# Patient Record
Sex: FEMALE | Race: WHITE | NOT HISPANIC OR LATINO | Employment: OTHER | URBAN - METROPOLITAN AREA
[De-identification: names, ages, dates, MRNs, and addresses within clinical notes are randomized per-mention and may not be internally consistent; named-entity substitution may affect disease eponyms.]

---

## 2018-12-14 NOTE — PATIENT DISCUSSION
This visual field clearly demonstrated a minimum of 46% loss of upper field of vision OU, with upper lid skin in repose and elevated by taping of the lid to demonstrate potential correction. This field shows that taping the lids significantly improved this patient's superior field of vision by approximately 44%, OU.

## 2019-02-05 NOTE — PROCEDURE NOTE: SURGICAL
"<span style=""font-weight: bold;"">MR #:&nbsp;</span>&nbsp;[id number]<br /> <br /> <span style=""font-weight: bold;"">PREOPERATIVE DIAGNOSIS:</span>&nbsp;Dermatochalasis upper lids<br /> <br /> <span style=""font-weight: bold;"">POSTOPERATIVE DIAGNOSIS:</span>&nbsp;Same<br /> <br /> <span style=""font-weight: bold;"">OPERATIVE PROCEDURE:</span>&nbsp; Upper lid blepharoplasty both eyes<br /> <br /> <span style=""font-weight: bold;"">ANESTHESIA: &nbsp;</span> &nbsp;&nbsp; Local MAC<br /> <br /> <span style=""font-weight: bold;"">ESTIMATED BLOOD LOSS:</span> &nbsp;Minimal

## 2019-10-28 ENCOUNTER — RETINA CONSULT (OUTPATIENT)
Dept: URBAN - METROPOLITAN AREA CLINIC 39 | Facility: CLINIC | Age: 69
End: 2019-10-28

## 2019-10-28 DIAGNOSIS — H35.341: ICD-10-CM

## 2019-10-28 DIAGNOSIS — H35.033: ICD-10-CM

## 2019-10-28 DIAGNOSIS — H35.371: ICD-10-CM

## 2019-10-28 DIAGNOSIS — H35.372: ICD-10-CM

## 2019-10-28 PROCEDURE — 99204 OFFICE O/P NEW MOD 45 MIN: CPT

## 2019-10-28 PROCEDURE — 92134 CPTRZ OPH DX IMG PST SGM RTA: CPT

## 2019-10-28 PROCEDURE — 92235 FLUORESCEIN ANGRPH MLTIFRAME: CPT

## 2019-10-28 PROCEDURE — 92225 OPHTHALMOSCOPY (INITIAL): CPT

## 2019-10-28 PROCEDURE — 92250 FUNDUS PHOTOGRAPHY W/I&R: CPT

## 2019-10-28 RX ORDER — PREDNISOLONE ACETATE 10 MG/ML: 1 SUSPENSION/ DROPS OPHTHALMIC

## 2019-10-28 ASSESSMENT — VISUAL ACUITY
OD_SC: 20/80
OS_SC: 20/70+2

## 2019-10-28 ASSESSMENT — TONOMETRY
OS_IOP_MMHG: 19
OD_IOP_MMHG: 20

## 2019-11-26 ENCOUNTER — FOLLOW UP (OUTPATIENT)
Dept: URBAN - METROPOLITAN AREA CLINIC 46 | Facility: CLINIC | Age: 69
End: 2019-11-26

## 2019-11-26 DIAGNOSIS — H35.341: ICD-10-CM

## 2019-11-26 DIAGNOSIS — H35.371: ICD-10-CM

## 2019-11-26 DIAGNOSIS — H35.372: ICD-10-CM

## 2019-11-26 DIAGNOSIS — H35.033: ICD-10-CM

## 2019-11-26 DIAGNOSIS — H35.30: ICD-10-CM

## 2019-11-26 DIAGNOSIS — H35.09: ICD-10-CM

## 2019-11-26 PROCEDURE — 92012 INTRM OPH EXAM EST PATIENT: CPT

## 2019-11-26 PROCEDURE — 92134 CPTRZ OPH DX IMG PST SGM RTA: CPT

## 2019-11-26 ASSESSMENT — TONOMETRY
OS_IOP_MMHG: 14
OD_IOP_MMHG: 17

## 2019-11-26 ASSESSMENT — VISUAL ACUITY
OD_PH: 20/70
OD_SC: 20/80-1
OS_PH: 20/40+1
OS_SC: 20/50+2

## 2020-01-29 ENCOUNTER — ESTABLISHED COMPREHENSIVE EXAM (OUTPATIENT)
Dept: URBAN - METROPOLITAN AREA CLINIC 39 | Facility: CLINIC | Age: 70
End: 2020-01-29

## 2020-01-29 DIAGNOSIS — H35.341: ICD-10-CM

## 2020-01-29 DIAGNOSIS — H35.033: ICD-10-CM

## 2020-01-29 DIAGNOSIS — H35.372: ICD-10-CM

## 2020-01-29 DIAGNOSIS — H35.371: ICD-10-CM

## 2020-01-29 PROCEDURE — 92235 FLUORESCEIN ANGRPH MLTIFRAME: CPT

## 2020-01-29 PROCEDURE — 92250 FUNDUS PHOTOGRAPHY W/I&R: CPT

## 2020-01-29 PROCEDURE — 92012 INTRM OPH EXAM EST PATIENT: CPT

## 2020-01-29 ASSESSMENT — TONOMETRY
OD_IOP_MMHG: 16
OS_IOP_MMHG: 14

## 2020-01-29 ASSESSMENT — VISUAL ACUITY
OS_SC: 20/70-1
OD_SC: 20/80-1

## 2020-05-18 ENCOUNTER — ESTABLISHED COMPREHENSIVE EXAM (OUTPATIENT)
Dept: URBAN - METROPOLITAN AREA CLINIC 39 | Facility: CLINIC | Age: 70
End: 2020-05-18

## 2020-05-18 DIAGNOSIS — H35.033: ICD-10-CM

## 2020-05-18 DIAGNOSIS — H35.09: ICD-10-CM

## 2020-05-18 DIAGNOSIS — H35.30: ICD-10-CM

## 2020-05-18 DIAGNOSIS — H35.371: ICD-10-CM

## 2020-05-18 DIAGNOSIS — H35.372: ICD-10-CM

## 2020-05-18 DIAGNOSIS — H35.341: ICD-10-CM

## 2020-05-18 PROCEDURE — 92134 CPTRZ OPH DX IMG PST SGM RTA: CPT

## 2020-05-18 PROCEDURE — 92235 FLUORESCEIN ANGRPH MLTIFRAME: CPT

## 2020-05-18 PROCEDURE — 92014 COMPRE OPH EXAM EST PT 1/>: CPT

## 2020-05-18 PROCEDURE — 92250 FUNDUS PHOTOGRAPHY W/I&R: CPT

## 2020-05-18 ASSESSMENT — VISUAL ACUITY
OS_SC: 20/40-1
OD_SC: 20/50-2

## 2021-05-17 ENCOUNTER — ESTABLISHED COMPREHENSIVE EXAM (OUTPATIENT)
Dept: URBAN - METROPOLITAN AREA CLINIC 39 | Facility: CLINIC | Age: 71
End: 2021-05-17

## 2021-05-17 DIAGNOSIS — H35.371: ICD-10-CM

## 2021-05-17 DIAGNOSIS — H35.033: ICD-10-CM

## 2021-05-17 DIAGNOSIS — H35.372: ICD-10-CM

## 2021-05-17 DIAGNOSIS — H35.09: ICD-10-CM

## 2021-05-17 DIAGNOSIS — H35.341: ICD-10-CM

## 2021-05-17 PROCEDURE — 92235 FLUORESCEIN ANGRPH MLTIFRAME: CPT

## 2021-05-17 PROCEDURE — 99214 OFFICE O/P EST MOD 30 MIN: CPT

## 2021-05-17 PROCEDURE — 92250 FUNDUS PHOTOGRAPHY W/I&R: CPT

## 2021-05-17 ASSESSMENT — VISUAL ACUITY
OS_SC: 20/50-2
OD_SC: 20/80

## 2021-05-17 ASSESSMENT — TONOMETRY
OD_IOP_MMHG: 14
OS_IOP_MMHG: 13

## 2021-06-28 ENCOUNTER — ESTABLISHED COMPREHENSIVE EXAM (OUTPATIENT)
Dept: URBAN - METROPOLITAN AREA CLINIC 39 | Facility: CLINIC | Age: 71
End: 2021-06-28

## 2021-06-28 DIAGNOSIS — H25.13: ICD-10-CM

## 2021-06-28 DIAGNOSIS — H35.372: ICD-10-CM

## 2021-06-28 DIAGNOSIS — H52.02: ICD-10-CM

## 2021-06-28 DIAGNOSIS — H35.371: ICD-10-CM

## 2021-06-28 DIAGNOSIS — H52.4: ICD-10-CM

## 2021-06-28 DIAGNOSIS — H52.203: ICD-10-CM

## 2021-06-28 DIAGNOSIS — H35.033: ICD-10-CM

## 2021-06-28 DIAGNOSIS — H35.30: ICD-10-CM

## 2021-06-28 DIAGNOSIS — H35.341: ICD-10-CM

## 2021-06-28 DIAGNOSIS — H43.822: ICD-10-CM

## 2021-06-28 DIAGNOSIS — H35.09: ICD-10-CM

## 2021-06-28 PROCEDURE — 92014 COMPRE OPH EXAM EST PT 1/>: CPT

## 2021-06-28 PROCEDURE — 92134 CPTRZ OPH DX IMG PST SGM RTA: CPT

## 2021-06-28 PROCEDURE — 92015 DETERMINE REFRACTIVE STATE: CPT

## 2021-06-28 ASSESSMENT — VISUAL ACUITY
OD_SC: 20/100-1
OD_SC: <J12
OU_SC: 20/40
OS_SC: J12
OS_SC: 20/50+1

## 2021-06-28 ASSESSMENT — TONOMETRY
OS_IOP_MMHG: 16
OD_IOP_MMHG: 15

## 2021-11-15 ENCOUNTER — ESTABLISHED PATIENT (OUTPATIENT)
Dept: URBAN - METROPOLITAN AREA CLINIC 39 | Facility: CLINIC | Age: 71
End: 2021-11-15

## 2021-11-15 DIAGNOSIS — D23.122: ICD-10-CM

## 2021-11-15 PROCEDURE — 99213 OFFICE O/P EST LOW 20 MIN: CPT

## 2021-11-15 PROCEDURE — 92285 EXTERNAL OCULAR PHOTOGRAPHY: CPT

## 2021-11-15 PROCEDURE — 67840 REMOVE EYELID LESION: CPT

## 2021-11-15 RX ORDER — ERYTHROMYCIN 5 MG/G
OINTMENT OPHTHALMIC TWICE A DAY
End: 2021-11-29

## 2021-11-15 ASSESSMENT — VISUAL ACUITY
OS_SC: 20/50
OD_SC: 20/60

## 2022-05-23 ENCOUNTER — COMPREHENSIVE EXAM (OUTPATIENT)
Dept: URBAN - METROPOLITAN AREA CLINIC 39 | Facility: CLINIC | Age: 72
End: 2022-05-23

## 2022-05-23 DIAGNOSIS — H35.373: ICD-10-CM

## 2022-05-23 DIAGNOSIS — H35.341: ICD-10-CM

## 2022-05-23 DIAGNOSIS — H35.033: ICD-10-CM

## 2022-05-23 DIAGNOSIS — H43.822: ICD-10-CM

## 2022-05-23 DIAGNOSIS — H35.09: ICD-10-CM

## 2022-05-23 PROCEDURE — 92250 FUNDUS PHOTOGRAPHY W/I&R: CPT

## 2022-05-23 PROCEDURE — 99214 OFFICE O/P EST MOD 30 MIN: CPT

## 2022-05-23 PROCEDURE — 92235 FLUORESCEIN ANGRPH MLTIFRAME: CPT

## 2022-05-23 ASSESSMENT — VISUAL ACUITY
OS_PH: 20/40
OD_SC: 20/70+2
OS_SC: 20/60

## 2022-05-23 ASSESSMENT — TONOMETRY
OD_IOP_MMHG: 15
OS_IOP_MMHG: 16

## 2022-11-28 ENCOUNTER — COMPREHENSIVE EXAM (OUTPATIENT)
Dept: URBAN - METROPOLITAN AREA CLINIC 39 | Facility: CLINIC | Age: 72
End: 2022-11-28

## 2022-11-28 DIAGNOSIS — H43.822: ICD-10-CM

## 2022-11-28 DIAGNOSIS — H35.341: ICD-10-CM

## 2022-11-28 DIAGNOSIS — H35.033: ICD-10-CM

## 2022-11-28 DIAGNOSIS — H35.373: ICD-10-CM

## 2022-11-28 DIAGNOSIS — H52.4: ICD-10-CM

## 2022-11-28 DIAGNOSIS — H35.30: ICD-10-CM

## 2022-11-28 DIAGNOSIS — H25.813: ICD-10-CM

## 2022-11-28 DIAGNOSIS — H35.09: ICD-10-CM

## 2022-11-28 DIAGNOSIS — H52.02: ICD-10-CM

## 2022-11-28 DIAGNOSIS — H52.203: ICD-10-CM

## 2022-11-28 PROCEDURE — 92014 COMPRE OPH EXAM EST PT 1/>: CPT

## 2022-11-28 PROCEDURE — 92015 DETERMINE REFRACTIVE STATE: CPT

## 2022-11-28 PROCEDURE — 92134 CPTRZ OPH DX IMG PST SGM RTA: CPT

## 2022-11-28 ASSESSMENT — VISUAL ACUITY
OD_SC: 20/100
OS_SC: J16
OU_SC: 20/30
OS_SC: 20/30-1
OU_SC: J7
OD_SC: J16

## 2022-11-28 ASSESSMENT — TONOMETRY
OS_IOP_MMHG: 12
OD_IOP_MMHG: 15

## 2023-05-08 ENCOUNTER — COMPREHENSIVE EXAM (OUTPATIENT)
Dept: URBAN - METROPOLITAN AREA CLINIC 39 | Facility: CLINIC | Age: 73
End: 2023-05-08

## 2023-05-08 DIAGNOSIS — H35.30: ICD-10-CM

## 2023-05-08 DIAGNOSIS — H35.033: ICD-10-CM

## 2023-05-08 DIAGNOSIS — H43.822: ICD-10-CM

## 2023-05-08 DIAGNOSIS — H35.373: ICD-10-CM

## 2023-05-08 DIAGNOSIS — H35.341: ICD-10-CM

## 2023-05-08 DIAGNOSIS — H25.813: ICD-10-CM

## 2023-05-08 DIAGNOSIS — H35.09: ICD-10-CM

## 2023-05-08 PROCEDURE — 99214 OFFICE O/P EST MOD 30 MIN: CPT

## 2023-05-08 PROCEDURE — 92235 FLUORESCEIN ANGRPH MLTIFRAME: CPT

## 2023-05-08 PROCEDURE — 92250 FUNDUS PHOTOGRAPHY W/I&R: CPT

## 2023-05-08 ASSESSMENT — VISUAL ACUITY
OS_CC: 20/30-1
OD_CC: 20/70-1

## 2023-05-08 ASSESSMENT — TONOMETRY
OS_IOP_MMHG: 16
OD_IOP_MMHG: 14

## 2023-07-05 ENCOUNTER — CONSULTATION/EVALUATION (OUTPATIENT)
Dept: URBAN - METROPOLITAN AREA CLINIC 39 | Facility: CLINIC | Age: 73
End: 2023-07-05

## 2023-07-05 DIAGNOSIS — H35.30: ICD-10-CM

## 2023-07-05 DIAGNOSIS — H04.123: ICD-10-CM

## 2023-07-05 DIAGNOSIS — H35.033: ICD-10-CM

## 2023-07-05 DIAGNOSIS — H52.02: ICD-10-CM

## 2023-07-05 DIAGNOSIS — H35.341: ICD-10-CM

## 2023-07-05 DIAGNOSIS — H43.822: ICD-10-CM

## 2023-07-05 DIAGNOSIS — H35.09: ICD-10-CM

## 2023-07-05 DIAGNOSIS — H35.373: ICD-10-CM

## 2023-07-05 DIAGNOSIS — H25.813: ICD-10-CM

## 2023-07-05 PROCEDURE — 92015 DETERMINE REFRACTIVE STATE: CPT

## 2023-07-05 PROCEDURE — V2799PMN IMPRIMIS PRED-MOXI-NEPAF 5ML

## 2023-07-05 PROCEDURE — 92136TC INTERFEROMETRY - TECHNICAL COMPONENT

## 2023-07-05 PROCEDURE — 99204 OFFICE O/P NEW MOD 45 MIN: CPT

## 2023-07-05 PROCEDURE — 92134 CPTRZ OPH DX IMG PST SGM RTA: CPT

## 2023-07-05 PROCEDURE — 92025-3 CORNEAL TOPO, REFUSED

## 2023-07-05 ASSESSMENT — VISUAL ACUITY
OD_SC: J10
OS_CC: J3
OS_SC: 20/100
OS_SC: J10
OD_BAT: 20/400
OD_CC: J10
OD_CC: 20/100
OD_SC: 20/400
OS_CC: 20/40
OS_BAT: 20/400

## 2023-07-05 ASSESSMENT — TONOMETRY
OS_IOP_MMHG: 15
OD_IOP_MMHG: 13

## 2023-07-11 ENCOUNTER — PRE-OP/H&P (OUTPATIENT)
Dept: URBAN - METROPOLITAN AREA SURGERY 14 | Facility: SURGERY | Age: 73
End: 2023-07-11

## 2023-07-11 ENCOUNTER — SURGERY/PROCEDURE (OUTPATIENT)
Dept: URBAN - METROPOLITAN AREA CLINIC 39 | Facility: CLINIC | Age: 73
End: 2023-07-11

## 2023-07-11 DIAGNOSIS — H35.341: ICD-10-CM

## 2023-07-11 DIAGNOSIS — H35.30: ICD-10-CM

## 2023-07-11 DIAGNOSIS — H25.813: ICD-10-CM

## 2023-07-11 DIAGNOSIS — H04.123: ICD-10-CM

## 2023-07-11 DIAGNOSIS — H35.373: ICD-10-CM

## 2023-07-11 DIAGNOSIS — H43.822: ICD-10-CM

## 2023-07-11 DIAGNOSIS — H35.033: ICD-10-CM

## 2023-07-11 PROCEDURE — 99211HP H&P OFFICE/OUTPATIENT VISIT, EST

## 2023-07-11 PROCEDURE — 66984CV REMOVE CATARACT, INSERT LENS, CUSTOM VISION

## 2023-07-11 PROCEDURE — 66999LNSR LENSAR LASER FOR CAT SX

## 2023-07-12 ENCOUNTER — POST-OP (OUTPATIENT)
Dept: URBAN - METROPOLITAN AREA CLINIC 38 | Facility: CLINIC | Age: 73
End: 2023-07-12

## 2023-07-12 DIAGNOSIS — Z96.1: ICD-10-CM

## 2023-07-12 PROCEDURE — 99024 POSTOP FOLLOW-UP VISIT: CPT

## 2023-07-12 ASSESSMENT — TONOMETRY
OS_IOP_MMHG: 17
OD_IOP_MMHG: 17

## 2023-07-12 ASSESSMENT — VISUAL ACUITY
OD_SC: 20/100
OS_SC: 20/40+2

## 2023-07-19 ENCOUNTER — POST OP/EVAL OF SECOND EYE (OUTPATIENT)
Dept: URBAN - METROPOLITAN AREA CLINIC 39 | Facility: CLINIC | Age: 73
End: 2023-07-19

## 2023-07-19 DIAGNOSIS — H35.341: ICD-10-CM

## 2023-07-19 DIAGNOSIS — H35.30: ICD-10-CM

## 2023-07-19 DIAGNOSIS — H43.822: ICD-10-CM

## 2023-07-19 DIAGNOSIS — Z96.1: ICD-10-CM

## 2023-07-19 DIAGNOSIS — H04.123: ICD-10-CM

## 2023-07-19 DIAGNOSIS — H25.811: ICD-10-CM

## 2023-07-19 DIAGNOSIS — H35.373: ICD-10-CM

## 2023-07-19 PROCEDURE — 99024 POSTOP FOLLOW-UP VISIT: CPT

## 2023-07-19 PROCEDURE — 92012 INTRM OPH EXAM EST PATIENT: CPT

## 2023-07-19 ASSESSMENT — VISUAL ACUITY
OD_BAT: 20/400
OS_SC: 20/25-1
OD_SC: 20/100

## 2023-07-19 ASSESSMENT — TONOMETRY
OD_IOP_MMHG: 16
OS_IOP_MMHG: 16

## 2023-07-21 ENCOUNTER — PRE-OP/H&P (OUTPATIENT)
Dept: URBAN - METROPOLITAN AREA SURGERY 14 | Facility: SURGERY | Age: 73
End: 2023-07-21

## 2023-07-21 ENCOUNTER — SURGERY/PROCEDURE (OUTPATIENT)
Dept: URBAN - METROPOLITAN AREA CLINIC 39 | Facility: CLINIC | Age: 73
End: 2023-07-21

## 2023-07-21 ENCOUNTER — TECH ONLY (OUTPATIENT)
Dept: URBAN - METROPOLITAN AREA CLINIC 39 | Facility: CLINIC | Age: 73
End: 2023-07-21

## 2023-07-21 DIAGNOSIS — H35.373: ICD-10-CM

## 2023-07-21 DIAGNOSIS — Z96.1: ICD-10-CM

## 2023-07-21 DIAGNOSIS — H43.822: ICD-10-CM

## 2023-07-21 DIAGNOSIS — H35.341: ICD-10-CM

## 2023-07-21 DIAGNOSIS — H04.123: ICD-10-CM

## 2023-07-21 DIAGNOSIS — H25.811: ICD-10-CM

## 2023-07-21 DIAGNOSIS — H35.30: ICD-10-CM

## 2023-07-21 PROCEDURE — 65772LRI LRI DURING CAT SX

## 2023-07-21 PROCEDURE — 99211T TECH SERVICE

## 2023-07-21 PROCEDURE — 66999LNSR LENSAR LASER FOR CAT SX

## 2023-07-21 PROCEDURE — 99211HP H&P OFFICE/OUTPATIENT VISIT, EST

## 2023-07-21 PROCEDURE — 66984CV REMOVE CATARACT, INSERT LENS, CUSTOM VISION

## 2023-07-21 ASSESSMENT — TONOMETRY
OD_IOP_MMHG: 15
OS_IOP_MMHG: 14

## 2023-07-21 ASSESSMENT — VISUAL ACUITY: OD_SC: 20/80

## 2023-07-24 ENCOUNTER — POST-OP (OUTPATIENT)
Dept: URBAN - METROPOLITAN AREA CLINIC 39 | Facility: CLINIC | Age: 73
End: 2023-07-24

## 2023-07-24 DIAGNOSIS — Z96.1: ICD-10-CM

## 2023-07-24 PROCEDURE — 99024 POSTOP FOLLOW-UP VISIT: CPT

## 2023-07-24 RX ORDER — PREDNISOLONE ACETATE 10 MG/ML: 1 SUSPENSION/ DROPS OPHTHALMIC AS DIRECTED

## 2023-07-24 ASSESSMENT — VISUAL ACUITY
OU_SC: J7
OS_SC: J7
OD_SC: J16
OD_SC: 20/400
OU_SC: 20/40+2
OS_PH: 20/30
OS_SC: 20/40
OD_PH: 20/70-1

## 2023-07-24 ASSESSMENT — TONOMETRY
OD_IOP_MMHG: 15
OS_IOP_MMHG: 15

## 2023-08-16 ENCOUNTER — POST-OP (OUTPATIENT)
Dept: URBAN - METROPOLITAN AREA CLINIC 39 | Facility: CLINIC | Age: 73
End: 2023-08-16

## 2023-08-16 DIAGNOSIS — Z96.1: ICD-10-CM

## 2023-08-16 PROCEDURE — 99024 POSTOP FOLLOW-UP VISIT: CPT

## 2023-08-16 ASSESSMENT — TONOMETRY
OS_IOP_MMHG: 17
OD_IOP_MMHG: 18

## 2023-08-16 ASSESSMENT — VISUAL ACUITY
OS_SC: 20/30+2
OD_SC: 20/60-2
OU_SC: 20/25

## 2023-08-23 ENCOUNTER — EMERGENCY VISIT (OUTPATIENT)
Dept: URBAN - METROPOLITAN AREA CLINIC 39 | Facility: CLINIC | Age: 73
End: 2023-08-23

## 2023-08-23 DIAGNOSIS — Z96.1: ICD-10-CM

## 2023-08-23 PROCEDURE — 99024 POSTOP FOLLOW-UP VISIT: CPT

## 2023-08-23 RX ORDER — PREDNISOLONE ACETATE 10 MG/ML: 1 SUSPENSION/ DROPS OPHTHALMIC

## 2023-08-23 ASSESSMENT — VISUAL ACUITY
OU_SC: 20/25-1
OS_SC: 20/25-1
OD_SC: 20/80+1

## 2023-08-29 ENCOUNTER — POST-OP (OUTPATIENT)
Dept: URBAN - METROPOLITAN AREA CLINIC 39 | Facility: CLINIC | Age: 73
End: 2023-08-29

## 2023-08-29 DIAGNOSIS — Z96.1: ICD-10-CM

## 2023-08-29 PROCEDURE — 99024 POSTOP FOLLOW-UP VISIT: CPT

## 2023-08-29 ASSESSMENT — VISUAL ACUITY
OS_SC: 20/25+2
OD_SC: 20/60-2
OU_SC: 20/25

## 2023-08-29 ASSESSMENT — TONOMETRY
OD_IOP_MMHG: 16
OS_IOP_MMHG: 16

## 2023-09-27 ENCOUNTER — POST-OP (OUTPATIENT)
Dept: URBAN - METROPOLITAN AREA CLINIC 39 | Facility: CLINIC | Age: 73
End: 2023-09-27

## 2023-09-27 DIAGNOSIS — Z96.1: ICD-10-CM

## 2023-09-27 PROCEDURE — 99024 POSTOP FOLLOW-UP VISIT: CPT

## 2023-09-27 RX ORDER — KETOROLAC TROMETHAMINE 5 MG/ML: 1 SOLUTION OPHTHALMIC

## 2023-09-27 ASSESSMENT — VISUAL ACUITY
OU_SC: 20/30+2
OS_SC: 20/30-1
OD_SC: 20/70-2

## 2023-09-27 ASSESSMENT — TONOMETRY
OS_IOP_MMHG: 16
OD_IOP_MMHG: 17

## 2023-10-12 ENCOUNTER — ESTABLISHED PATIENT (OUTPATIENT)
Dept: URBAN - METROPOLITAN AREA CLINIC 35 | Facility: CLINIC | Age: 73
End: 2023-10-12

## 2023-10-12 DIAGNOSIS — H35.373: ICD-10-CM

## 2023-10-12 DIAGNOSIS — H35.09: ICD-10-CM

## 2023-10-12 DIAGNOSIS — H43.822: ICD-10-CM

## 2023-10-12 DIAGNOSIS — H35.30: ICD-10-CM

## 2023-10-12 DIAGNOSIS — H35.341: ICD-10-CM

## 2023-10-12 DIAGNOSIS — H20.00: ICD-10-CM

## 2023-10-12 DIAGNOSIS — H35.033: ICD-10-CM

## 2023-10-12 DIAGNOSIS — H35.351: ICD-10-CM

## 2023-10-12 DIAGNOSIS — H04.123: ICD-10-CM

## 2023-10-12 PROCEDURE — 92235 FLUORESCEIN ANGRPH MLTIFRAME: CPT

## 2023-10-12 PROCEDURE — 92287 ANT SGM IMG IR FLRSCN ANGRPH: CPT

## 2023-10-12 PROCEDURE — 92250 FUNDUS PHOTOGRAPHY W/I&R: CPT

## 2023-10-12 PROCEDURE — 67515 INJECT/TREAT EYE SOCKET: CPT

## 2023-10-12 PROCEDURE — 99214 OFFICE O/P EST MOD 30 MIN: CPT | Mod: 24,25

## 2023-10-12 ASSESSMENT — TONOMETRY
OS_IOP_MMHG: 14
OD_IOP_MMHG: 18

## 2023-10-12 ASSESSMENT — VISUAL ACUITY: OS_SC: 20/40-2

## 2023-11-20 ENCOUNTER — ESTABLISHED PATIENT (OUTPATIENT)
Dept: URBAN - METROPOLITAN AREA CLINIC 39 | Facility: CLINIC | Age: 73
End: 2023-11-20

## 2023-11-20 DIAGNOSIS — H35.351: ICD-10-CM

## 2023-11-20 DIAGNOSIS — H35.373: ICD-10-CM

## 2023-11-20 DIAGNOSIS — H20.00: ICD-10-CM

## 2023-11-20 DIAGNOSIS — H35.341: ICD-10-CM

## 2023-11-20 DIAGNOSIS — H35.09: ICD-10-CM

## 2023-11-20 DIAGNOSIS — H35.033: ICD-10-CM

## 2023-11-20 DIAGNOSIS — H04.123: ICD-10-CM

## 2023-11-20 DIAGNOSIS — H43.822: ICD-10-CM

## 2023-11-20 DIAGNOSIS — H35.30: ICD-10-CM

## 2023-11-20 PROCEDURE — 92250 FUNDUS PHOTOGRAPHY W/I&R: CPT

## 2023-11-20 PROCEDURE — 99213 OFFICE O/P EST LOW 20 MIN: CPT

## 2023-11-20 ASSESSMENT — VISUAL ACUITY
OD_SC: 20/80-1
OS_SC: 20/30
OD_PH: 20/70-1

## 2023-11-20 ASSESSMENT — TONOMETRY
OS_IOP_MMHG: 15
OD_IOP_MMHG: 19

## 2024-02-20 ENCOUNTER — COMPREHENSIVE EXAM (OUTPATIENT)
Dept: URBAN - METROPOLITAN AREA CLINIC 39 | Facility: CLINIC | Age: 74
End: 2024-02-20

## 2024-02-20 DIAGNOSIS — H26.493: ICD-10-CM

## 2024-02-20 DIAGNOSIS — H20.00: ICD-10-CM

## 2024-02-20 DIAGNOSIS — H35.351: ICD-10-CM

## 2024-02-20 DIAGNOSIS — H35.033: ICD-10-CM

## 2024-02-20 DIAGNOSIS — H35.341: ICD-10-CM

## 2024-02-20 DIAGNOSIS — H35.373: ICD-10-CM

## 2024-02-20 DIAGNOSIS — H35.09: ICD-10-CM

## 2024-02-20 DIAGNOSIS — H43.822: ICD-10-CM

## 2024-02-20 DIAGNOSIS — H35.30: ICD-10-CM

## 2024-02-20 DIAGNOSIS — H04.123: ICD-10-CM

## 2024-02-20 DIAGNOSIS — Z96.1: ICD-10-CM

## 2024-02-20 PROCEDURE — 92012 INTRM OPH EXAM EST PATIENT: CPT

## 2024-02-20 PROCEDURE — 92250 FUNDUS PHOTOGRAPHY W/I&R: CPT

## 2024-02-20 ASSESSMENT — VISUAL ACUITY
OS_SC: 20/30-2
OU_SC: 20/30-2
OD_SC: 20/70-2

## 2024-02-20 ASSESSMENT — TONOMETRY
OD_IOP_MMHG: 14
OS_IOP_MMHG: 14

## 2024-07-30 ENCOUNTER — COMPREHENSIVE EXAM (OUTPATIENT)
Dept: URBAN - METROPOLITAN AREA CLINIC 39 | Facility: CLINIC | Age: 74
End: 2024-07-30

## 2024-07-30 DIAGNOSIS — H35.351: ICD-10-CM

## 2024-07-30 DIAGNOSIS — H35.373: ICD-10-CM

## 2024-07-30 DIAGNOSIS — H43.822: ICD-10-CM

## 2024-07-30 DIAGNOSIS — H35.341: ICD-10-CM

## 2024-07-30 DIAGNOSIS — H35.033: ICD-10-CM

## 2024-07-30 DIAGNOSIS — H20.00: ICD-10-CM

## 2024-07-30 DIAGNOSIS — H04.123: ICD-10-CM

## 2024-07-30 DIAGNOSIS — H35.30: ICD-10-CM

## 2024-07-30 DIAGNOSIS — H35.09: ICD-10-CM

## 2024-07-30 PROCEDURE — 99213 OFFICE O/P EST LOW 20 MIN: CPT

## 2024-07-30 PROCEDURE — 92235 FLUORESCEIN ANGRPH MLTIFRAME: CPT

## 2024-07-30 PROCEDURE — 92134 CPTRZ OPH DX IMG PST SGM RTA: CPT

## 2024-07-30 ASSESSMENT — TONOMETRY
OS_IOP_MMHG: 12
OD_IOP_MMHG: 13

## 2024-07-30 ASSESSMENT — VISUAL ACUITY
OD_SC: 20/70-1
OD_PH: 20/60-1
OS_SC: 20/30-1

## 2024-12-09 ENCOUNTER — COMPREHENSIVE EXAM (OUTPATIENT)
Age: 74
End: 2024-12-09

## 2024-12-09 DIAGNOSIS — H35.351: ICD-10-CM

## 2024-12-09 DIAGNOSIS — H35.09: ICD-10-CM

## 2024-12-09 DIAGNOSIS — H04.123: ICD-10-CM

## 2024-12-09 DIAGNOSIS — H35.373: ICD-10-CM

## 2024-12-09 DIAGNOSIS — Z96.1: ICD-10-CM

## 2024-12-09 DIAGNOSIS — H35.341: ICD-10-CM

## 2024-12-09 DIAGNOSIS — H43.822: ICD-10-CM

## 2024-12-09 DIAGNOSIS — H26.493: ICD-10-CM

## 2024-12-09 DIAGNOSIS — H35.033: ICD-10-CM

## 2024-12-09 DIAGNOSIS — H35.30: ICD-10-CM

## 2024-12-09 PROCEDURE — 92134 CPTRZ OPH DX IMG PST SGM RTA: CPT

## 2024-12-09 PROCEDURE — 92014 COMPRE OPH EXAM EST PT 1/>: CPT

## 2025-02-17 ENCOUNTER — CONSULTATION/EVALUATION (OUTPATIENT)
Age: 75
End: 2025-02-17

## 2025-02-17 VITALS — HEIGHT: 62 IN | WEIGHT: 170 LBS | BODY MASS INDEX: 31.28 KG/M2

## 2025-02-17 DIAGNOSIS — H02.834: ICD-10-CM

## 2025-02-17 DIAGNOSIS — H02.832: ICD-10-CM

## 2025-02-17 DIAGNOSIS — H04.123: ICD-10-CM

## 2025-02-17 DIAGNOSIS — H02.835: ICD-10-CM

## 2025-02-17 DIAGNOSIS — H02.831: ICD-10-CM

## 2025-02-17 PROCEDURE — 92285 EXTERNAL OCULAR PHOTOGRAPHY: CPT

## 2025-02-17 PROCEDURE — 99214 OFFICE O/P EST MOD 30 MIN: CPT

## 2025-02-21 ENCOUNTER — TECH ONLY (OUTPATIENT)
Age: 75
End: 2025-02-21

## 2025-02-21 DIAGNOSIS — H02.831: ICD-10-CM

## 2025-02-21 DIAGNOSIS — H02.834: ICD-10-CM

## 2025-02-21 PROCEDURE — 99211T TECH SERVICE

## 2025-02-21 PROCEDURE — 92081 LIMITED VISUAL FIELD XM: CPT
